# Patient Record
Sex: FEMALE
[De-identification: names, ages, dates, MRNs, and addresses within clinical notes are randomized per-mention and may not be internally consistent; named-entity substitution may affect disease eponyms.]

---

## 2019-10-30 ENCOUNTER — TELEPHONE (OUTPATIENT)
Dept: SCHEDULING | Age: 29
End: 2019-10-30

## 2019-10-30 ENCOUNTER — WALK IN (OUTPATIENT)
Dept: URGENT CARE | Age: 29
End: 2019-10-30

## 2019-10-30 VITALS
HEIGHT: 66 IN | BODY MASS INDEX: 20.89 KG/M2 | HEART RATE: 85 BPM | SYSTOLIC BLOOD PRESSURE: 118 MMHG | WEIGHT: 130 LBS | OXYGEN SATURATION: 99 % | DIASTOLIC BLOOD PRESSURE: 62 MMHG | TEMPERATURE: 98.4 F | RESPIRATION RATE: 16 BRPM

## 2019-10-30 DIAGNOSIS — J01.00 ACUTE NON-RECURRENT MAXILLARY SINUSITIS: Primary | ICD-10-CM

## 2019-10-30 PROCEDURE — 99203 OFFICE O/P NEW LOW 30 MIN: CPT | Performed by: NURSE PRACTITIONER

## 2019-10-30 RX ORDER — AMOXICILLIN AND CLAVULANATE POTASSIUM 875; 125 MG/1; MG/1
1 TABLET, FILM COATED ORAL EVERY 12 HOURS
Qty: 20 TABLET | Refills: 0 | Status: SHIPPED | OUTPATIENT
Start: 2019-10-30 | End: 2019-11-09

## 2019-10-30 ASSESSMENT — ENCOUNTER SYMPTOMS
FACIAL ASYMMETRY: 0
LIGHT-HEADEDNESS: 0
SEIZURES: 0
FEVER: 1
CHOKING: 0
NUMBNESS: 0
COUGH: 1
CHEST TIGHTNESS: 0
ACTIVITY CHANGE: 1
SPEECH DIFFICULTY: 0
APNEA: 0
SHORTNESS OF BREATH: 0
APPETITE CHANGE: 1
SORE THROAT: 1
WHEEZING: 0
WEAKNESS: 0
DIZZINESS: 0
ENDOCRINE NEGATIVE: 1
EYES NEGATIVE: 1
CHILLS: 0
PSYCHIATRIC NEGATIVE: 1
RHINORRHEA: 1
STRIDOR: 0
TREMORS: 0
FATIGUE: 1
SINUS PRESSURE: 1
DIAPHORESIS: 0
GASTROINTESTINAL NEGATIVE: 1

## 2021-08-08 ENCOUNTER — HOSPITAL ENCOUNTER (EMERGENCY)
Facility: HOSPITAL | Age: 31
Discharge: HOME | End: 2021-08-08
Attending: EMERGENCY MEDICINE
Payer: COMMERCIAL

## 2021-08-08 ENCOUNTER — APPOINTMENT (OUTPATIENT)
Dept: ULTRASOUND IMAGING | Facility: HOSPITAL | Age: 31
End: 2021-08-08
Attending: EMERGENCY MEDICINE
Payer: COMMERCIAL

## 2021-08-08 VITALS
BODY MASS INDEX: 21.69 KG/M2 | WEIGHT: 135 LBS | HEIGHT: 66 IN | SYSTOLIC BLOOD PRESSURE: 144 MMHG | HEART RATE: 59 BPM | DIASTOLIC BLOOD PRESSURE: 99 MMHG | OXYGEN SATURATION: 99 % | RESPIRATION RATE: 17 BRPM | TEMPERATURE: 98 F

## 2021-08-08 DIAGNOSIS — N93.8 DYSFUNCTIONAL UTERINE BLEEDING: Primary | ICD-10-CM

## 2021-08-08 LAB
ALBUMIN SERPL-MCNC: 3.7 G/DL (ref 3.4–5)
ALBUMIN/GLOB SERPL: 1.1 {RATIO} (ref 1–2)
ALP LIVER SERPL-CCNC: 79 U/L
ALT SERPL-CCNC: 18 U/L
ANION GAP SERPL CALC-SCNC: 3 MMOL/L (ref 0–18)
AST SERPL-CCNC: 14 U/L (ref 15–37)
B-HCG UR QL: NEGATIVE
BASOPHILS # BLD AUTO: 0.05 X10(3) UL (ref 0–0.2)
BASOPHILS NFR BLD AUTO: 0.8 %
BILIRUB SERPL-MCNC: 0.2 MG/DL (ref 0.1–2)
BILIRUB UR QL STRIP.AUTO: NEGATIVE
BUN BLD-MCNC: 8 MG/DL (ref 7–18)
CALCIUM BLD-MCNC: 9.1 MG/DL (ref 8.5–10.1)
CHLORIDE SERPL-SCNC: 111 MMOL/L (ref 98–112)
CO2 SERPL-SCNC: 25 MMOL/L (ref 21–32)
CREAT BLD-MCNC: 0.87 MG/DL
EOSINOPHIL # BLD AUTO: 0.21 X10(3) UL (ref 0–0.7)
EOSINOPHIL NFR BLD AUTO: 3.3 %
ERYTHROCYTE [DISTWIDTH] IN BLOOD BY AUTOMATED COUNT: 13.8 %
GLOBULIN PLAS-MCNC: 3.4 G/DL (ref 2.8–4.4)
GLUCOSE BLD-MCNC: 89 MG/DL (ref 70–99)
GLUCOSE UR STRIP.AUTO-MCNC: NEGATIVE MG/DL
HCT VFR BLD AUTO: 37.5 %
HGB BLD-MCNC: 12 G/DL
IMM GRANULOCYTES # BLD AUTO: 0.02 X10(3) UL (ref 0–1)
IMM GRANULOCYTES NFR BLD: 0.3 %
KETONES UR STRIP.AUTO-MCNC: NEGATIVE MG/DL
LEUKOCYTE ESTERASE UR QL STRIP.AUTO: NEGATIVE
LYMPHOCYTES # BLD AUTO: 2.77 X10(3) UL (ref 1–4)
LYMPHOCYTES NFR BLD AUTO: 43.4 %
M PROTEIN MFR SERPL ELPH: 7.1 G/DL (ref 6.4–8.2)
MCH RBC QN AUTO: 26.8 PG (ref 26–34)
MCHC RBC AUTO-ENTMCNC: 32 G/DL (ref 31–37)
MCV RBC AUTO: 83.9 FL
MONOCYTES # BLD AUTO: 0.44 X10(3) UL (ref 0.1–1)
MONOCYTES NFR BLD AUTO: 6.9 %
NEUTROPHILS # BLD AUTO: 2.89 X10 (3) UL (ref 1.5–7.7)
NEUTROPHILS # BLD AUTO: 2.89 X10(3) UL (ref 1.5–7.7)
NEUTROPHILS NFR BLD AUTO: 45.3 %
NITRITE UR QL STRIP.AUTO: NEGATIVE
OSMOLALITY SERPL CALC.SUM OF ELEC: 286 MOSM/KG (ref 275–295)
PH UR STRIP.AUTO: 7 [PH] (ref 5–8)
PLATELET # BLD AUTO: 236 10(3)UL (ref 150–450)
POTASSIUM SERPL-SCNC: 4.2 MMOL/L (ref 3.5–5.1)
PROT UR STRIP.AUTO-MCNC: NEGATIVE MG/DL
RBC # BLD AUTO: 4.47 X10(6)UL
RBC #/AREA URNS AUTO: >10 /HPF
SODIUM SERPL-SCNC: 139 MMOL/L (ref 136–145)
SP GR UR STRIP.AUTO: 1.01 (ref 1–1.03)
UROBILINOGEN UR STRIP.AUTO-MCNC: <2 MG/DL
WBC # BLD AUTO: 6.4 X10(3) UL (ref 4–11)

## 2021-08-08 PROCEDURE — 99284 EMERGENCY DEPT VISIT MOD MDM: CPT

## 2021-08-08 PROCEDURE — 81025 URINE PREGNANCY TEST: CPT

## 2021-08-08 PROCEDURE — 93975 VASCULAR STUDY: CPT | Performed by: EMERGENCY MEDICINE

## 2021-08-08 PROCEDURE — 85025 COMPLETE CBC W/AUTO DIFF WBC: CPT | Performed by: EMERGENCY MEDICINE

## 2021-08-08 PROCEDURE — 36415 COLL VENOUS BLD VENIPUNCTURE: CPT

## 2021-08-08 PROCEDURE — 81001 URINALYSIS AUTO W/SCOPE: CPT | Performed by: EMERGENCY MEDICINE

## 2021-08-08 PROCEDURE — 80053 COMPREHEN METABOLIC PANEL: CPT | Performed by: EMERGENCY MEDICINE

## 2021-08-08 PROCEDURE — 76856 US EXAM PELVIC COMPLETE: CPT | Performed by: EMERGENCY MEDICINE

## 2021-08-08 PROCEDURE — 76830 TRANSVAGINAL US NON-OB: CPT | Performed by: EMERGENCY MEDICINE

## 2021-08-08 RX ORDER — IBUPROFEN 600 MG/1
600 TABLET ORAL EVERY 8 HOURS PRN
Qty: 30 TABLET | Refills: 0 | Status: SHIPPED | OUTPATIENT
Start: 2021-08-08 | End: 2021-08-18

## 2021-08-08 RX ORDER — LABETALOL 100 MG/1
100 TABLET, FILM COATED ORAL 2 TIMES DAILY
COMMUNITY
End: 2021-10-27

## 2021-08-08 RX ORDER — IBUPROFEN 600 MG/1
600 TABLET ORAL ONCE
Status: COMPLETED | OUTPATIENT
Start: 2021-08-08 | End: 2021-08-08

## 2021-08-08 NOTE — ED INITIAL ASSESSMENT (HPI)
Patient 5 weeks post partum. Heavy bleeding started this morning. Patient sts using heavy pads and sts went through 4-5 since 9 am. Pt sts slightly lightheaded this afternoon.

## 2021-08-10 NOTE — ED PROVIDER NOTES
Patient Seen in: [facility] Emergency Department      History   Patient presents with:  Eval-G    Stated Complaint: Pt is 5 weeks post partum - started with heavy bleeding     HPI/Subjective:   HPI    Patient is a 39-year-old female presents Dre Eduardo patient was already wearing a droplet mask on my arrival to the room.  Personal protective equipment including droplet mask, eye protection, and gloves were worn throughout the duration of the exam.  Handwashing was performed prior to and after the exam.  S -----------         ------                     CBC W/ DIFFERENTIAL[[id number]]                              Final result                 Please view results for these tests on the individual orders.    RAINBOW DRAW LAVENDER   RAINBOW DRAW LIGHT is normal arterial and venous Doppler wave forms in both ovaries. The spectral analysis is within normal limits. OTHER:  Negative. CONCLUSION:   1. No evidence of torsion. 2. Thickened endometrium is noted.   Definite retained products are not When identified these errors have been corrected.  While every attempt is made to correct errors during dictation discrepancies may still exist                             Disposition and Plan     Clinical Impression:  Dysfunctional uterine bleeding  (prima

## 2021-09-22 ENCOUNTER — HOSPITAL ENCOUNTER (EMERGENCY)
Facility: HOSPITAL | Age: 31
Discharge: HOME | End: 2021-09-23
Payer: COMMERCIAL

## 2021-09-22 ENCOUNTER — APPOINTMENT (OUTPATIENT)
Dept: CT IMAGING | Facility: HOSPITAL | Age: 31
End: 2021-09-22
Attending: NURSE PRACTITIONER
Payer: COMMERCIAL

## 2021-09-22 DIAGNOSIS — N12 PYELONEPHRITIS: Primary | ICD-10-CM

## 2021-09-22 LAB
ALBUMIN SERPL-MCNC: 3.9 G/DL (ref 3.4–5)
ALBUMIN/GLOB SERPL: 1.1 {RATIO} (ref 1–2)
ALP LIVER SERPL-CCNC: 60 U/L
ALT SERPL-CCNC: 16 U/L
ANION GAP SERPL CALC-SCNC: 8 MMOL/L (ref 0–18)
AST SERPL-CCNC: 10 U/L (ref 15–37)
B-HCG UR QL: NEGATIVE
BASOPHILS # BLD AUTO: 0.04 X10(3) UL (ref 0–0.2)
BASOPHILS NFR BLD AUTO: 0.6 %
BILIRUB SERPL-MCNC: 0.4 MG/DL (ref 0.1–2)
BILIRUB UR QL: NEGATIVE
BUN BLD-MCNC: 11 MG/DL (ref 7–18)
BUN/CREAT SERPL: 14.5 (ref 10–20)
CALCIUM BLD-MCNC: 9.2 MG/DL (ref 8.5–10.1)
CHLORIDE SERPL-SCNC: 107 MMOL/L (ref 98–112)
CLARITY UR: CLEAR
CO2 SERPL-SCNC: 24 MMOL/L (ref 21–32)
COLOR UR: YELLOW
CREAT BLD-MCNC: 0.76 MG/DL
DEPRECATED RDW RBC AUTO: 40 FL (ref 35.1–46.3)
EOSINOPHIL # BLD AUTO: 0.08 X10(3) UL (ref 0–0.7)
EOSINOPHIL NFR BLD AUTO: 1.1 %
ERYTHROCYTE [DISTWIDTH] IN BLOOD BY AUTOMATED COUNT: 13.2 % (ref 11–15)
GLOBULIN PLAS-MCNC: 3.5 G/DL (ref 2.8–4.4)
GLUCOSE BLD-MCNC: 92 MG/DL (ref 70–99)
GLUCOSE UR-MCNC: NEGATIVE MG/DL
HCT VFR BLD AUTO: 37.1 %
HGB BLD-MCNC: 12 G/DL
IMM GRANULOCYTES # BLD AUTO: 0.01 X10(3) UL (ref 0–1)
IMM GRANULOCYTES NFR BLD: 0.1 %
KETONES UR-MCNC: NEGATIVE MG/DL
LEUKOCYTE ESTERASE UR QL STRIP.AUTO: NEGATIVE
LIPASE SERPL-CCNC: 137 U/L (ref 73–393)
LYMPHOCYTES # BLD AUTO: 3.04 X10(3) UL (ref 1–4)
LYMPHOCYTES NFR BLD AUTO: 43.2 %
MCH RBC QN AUTO: 26.8 PG (ref 26–34)
MCHC RBC AUTO-ENTMCNC: 32.3 G/DL (ref 31–37)
MCV RBC AUTO: 83 FL
MONOCYTES # BLD AUTO: 0.67 X10(3) UL (ref 0.1–1)
MONOCYTES NFR BLD AUTO: 9.5 %
NEUTROPHILS # BLD AUTO: 3.19 X10 (3) UL (ref 1.5–7.7)
NEUTROPHILS # BLD AUTO: 3.19 X10(3) UL (ref 1.5–7.7)
NEUTROPHILS NFR BLD AUTO: 45.5 %
NITRITE UR QL STRIP.AUTO: NEGATIVE
OSMOLALITY SERPL CALC.SUM OF ELEC: 287 MOSM/KG (ref 275–295)
PH UR: 6 [PH] (ref 5–8)
PLATELET # BLD AUTO: 214 10(3)UL (ref 150–450)
POTASSIUM SERPL-SCNC: 4 MMOL/L (ref 3.5–5.1)
PROT SERPL-MCNC: 7.4 G/DL (ref 6.4–8.2)
PROT UR-MCNC: NEGATIVE MG/DL
RBC # BLD AUTO: 4.47 X10(6)UL
SODIUM SERPL-SCNC: 139 MMOL/L (ref 136–145)
SP GR UR STRIP: 1.01 (ref 1–1.03)
UROBILINOGEN UR STRIP-ACNC: <2
WBC # BLD AUTO: 7 X10(3) UL (ref 4–11)

## 2021-09-22 PROCEDURE — 80053 COMPREHEN METABOLIC PANEL: CPT | Performed by: NURSE PRACTITIONER

## 2021-09-22 PROCEDURE — 96361 HYDRATE IV INFUSION ADD-ON: CPT

## 2021-09-22 PROCEDURE — 74176 CT ABD & PELVIS W/O CONTRAST: CPT | Performed by: NURSE PRACTITIONER

## 2021-09-22 PROCEDURE — 83690 ASSAY OF LIPASE: CPT | Performed by: NURSE PRACTITIONER

## 2021-09-22 PROCEDURE — 81001 URINALYSIS AUTO W/SCOPE: CPT | Performed by: NURSE PRACTITIONER

## 2021-09-22 PROCEDURE — 81025 URINE PREGNANCY TEST: CPT

## 2021-09-22 PROCEDURE — 85025 COMPLETE CBC W/AUTO DIFF WBC: CPT | Performed by: NURSE PRACTITIONER

## 2021-09-22 PROCEDURE — 96375 TX/PRO/DX INJ NEW DRUG ADDON: CPT

## 2021-09-22 PROCEDURE — 99284 EMERGENCY DEPT VISIT MOD MDM: CPT

## 2021-09-22 PROCEDURE — 96365 THER/PROPH/DIAG IV INF INIT: CPT

## 2021-09-22 RX ORDER — KETOROLAC TROMETHAMINE 15 MG/ML
15 INJECTION, SOLUTION INTRAMUSCULAR; INTRAVENOUS ONCE
Status: COMPLETED | OUTPATIENT
Start: 2021-09-22 | End: 2021-09-22

## 2021-09-22 RX ORDER — MORPHINE SULFATE 2 MG/ML
2 INJECTION, SOLUTION INTRAMUSCULAR; INTRAVENOUS ONCE
Status: COMPLETED | OUTPATIENT
Start: 2021-09-22 | End: 2021-09-22

## 2021-09-23 VITALS
HEIGHT: 66 IN | WEIGHT: 135 LBS | HEART RATE: 72 BPM | DIASTOLIC BLOOD PRESSURE: 85 MMHG | TEMPERATURE: 99 F | OXYGEN SATURATION: 99 % | RESPIRATION RATE: 16 BRPM | BODY MASS INDEX: 21.69 KG/M2 | SYSTOLIC BLOOD PRESSURE: 130 MMHG

## 2021-09-23 RX ORDER — CEPHALEXIN 500 MG/1
500 CAPSULE ORAL 2 TIMES DAILY
Qty: 20 CAPSULE | Refills: 0 | Status: SHIPPED | OUTPATIENT
Start: 2021-09-23 | End: 2021-09-23

## 2021-09-23 RX ORDER — CEPHALEXIN 500 MG/1
500 CAPSULE ORAL 2 TIMES DAILY
Qty: 20 CAPSULE | Refills: 0 | Status: SHIPPED | OUTPATIENT
Start: 2021-09-23 | End: 2021-10-03

## 2021-09-23 RX ORDER — ONDANSETRON 4 MG/1
4 TABLET, ORALLY DISINTEGRATING ORAL EVERY 4 HOURS PRN
Qty: 10 TABLET | Refills: 0 | Status: SHIPPED | OUTPATIENT
Start: 2021-09-23 | End: 2021-09-29

## 2021-09-23 NOTE — ED QUICK NOTES
Patient provided with discharge instructions. Verbalized understanding for plan of care at home and follow up. All question and concerns addressed prior to discharge. Iv removed, catheter intact. Let her know rx was sent to pharmacy.

## 2021-09-23 NOTE — ED PROVIDER NOTES
Patient Seen in: [facility] AND [facility] Emergency Department      History   Patient presents with:  Abdomen/Flank Pain    Stated Complaint: flank pain    Subjective:   32yo/f with hx of pre-eclampsia, 10 weeks post partum reports with left sided flank pain.  Luis M Ralphs sounds. Pulmonary:      Effort: Pulmonary effort is normal.      Breath sounds: Normal breath sounds. Abdominal:      General: Bowel sounds are normal.      Palpations: Abdomen is soft. Tenderness: There is no abdominal tenderness.  There is left C There is prominence of the bilateral ureters. Findings may indicate urinary tract infection with ascending UTI. Surgical change of recent  section. No fluid collection in the operative site/surgical wound. No obstructing renal stone.   4 mm

## 2021-10-22 PROBLEM — Z31.69 PRE-CONCEPTION COUNSELING: Status: ACTIVE | Noted: 2021-10-22

## 2021-11-24 ENCOUNTER — NURSE ONLY (OUTPATIENT)
Dept: HEMATOLOGY/ONCOLOGY | Facility: HOSPITAL | Age: 31
End: 2021-11-24
Payer: COMMERCIAL

## 2021-11-24 ENCOUNTER — GENETICS ENCOUNTER (OUTPATIENT)
Dept: GENETICS | Facility: HOSPITAL | Age: 31
End: 2021-11-24
Payer: COMMERCIAL

## 2021-11-24 DIAGNOSIS — Z87.59 HISTORY OF NEONATAL DEATH: ICD-10-CM

## 2021-11-24 DIAGNOSIS — Z31.430 ENCOUNTER OF FEMALE FOR TESTING FOR GENETIC DISEASE CARRIER STATUS FOR PROCREATIVE MANAGEMENT: Primary | ICD-10-CM

## 2021-11-24 PROCEDURE — 36415 COLL VENOUS BLD VENIPUNCTURE: CPT

## 2021-11-24 PROCEDURE — 96040 HC GENETIC COUNSELING EA 30 MIN: CPT

## 2021-11-24 NOTE — PROGRESS NOTES
Patient Name: Radha Skinner  YOB: 1990  Date of Visit: 2021    Reason for visit: Ms. Giulia Al was seen for the purposes of genetic counseling due to a history of miscarriage and a  death.     Medical History: Ms. Giulia Al is a plea information is known. Her paternal grandmother  in her early-80s with a h/o a gynecological cancer, possibly an ovarian cancer, dx in her mid-70s. Her paternal grandfather  >50y due to lung cancer.  The rest of the family history is negative for oth genetic test is 100 percent accurate and that a negative carrier screening result does not mean that any pregnancy will be completely healthy.   All women who are thinking about becoming pregnant or who are already pregnant are offered carrier screening for grandmother's cancer dx and she was encouraged to contact me with questions/updated information. Ms. Álvaro Beard appeared to understand the information presented. On the day of the visit Ms. Álvaro Beard elected to proceed with carrier screening and the comprehensiv

## 2021-12-03 ENCOUNTER — GENETICS ENCOUNTER (OUTPATIENT)
Dept: HEMATOLOGY/ONCOLOGY | Facility: HOSPITAL | Age: 31
End: 2021-12-03

## 2021-12-03 NOTE — PROGRESS NOTES
Patient Name: Clay Saeed  YOB: 1990         Reason for Referral:  Ms. Reyes Deans had genetic testing performed on 11/24/2021 for proactive carrier screening. Genetic Testing Result:  Negative for carrier status.  No pathogenic variant wa Please do not hesitate to contact my office if you have any questions or concerns, [phone].        Mallory Rosas MS, CGC

## 2021-12-27 ENCOUNTER — APPOINTMENT (OUTPATIENT)
Dept: ULTRASOUND IMAGING | Facility: HOSPITAL | Age: 31
End: 2021-12-27
Attending: EMERGENCY MEDICINE
Payer: COMMERCIAL

## 2021-12-27 ENCOUNTER — HOSPITAL ENCOUNTER (EMERGENCY)
Facility: HOSPITAL | Age: 31
Discharge: HOME | End: 2021-12-28
Attending: EMERGENCY MEDICINE
Payer: COMMERCIAL

## 2021-12-27 DIAGNOSIS — R10.9 LEFT FLANK PAIN: Primary | ICD-10-CM

## 2021-12-27 DIAGNOSIS — Z34.90 EARLY STAGE OF PREGNANCY: ICD-10-CM

## 2021-12-27 LAB
ANTIBODY SCREEN: NEGATIVE
B-HCG SERPL-ACNC: 7851 MIU/ML
BASOPHILS # BLD AUTO: 0.04 X10(3) UL (ref 0–0.2)
BASOPHILS NFR BLD AUTO: 0.5 %
EOSINOPHIL # BLD AUTO: 0.09 X10(3) UL (ref 0–0.7)
EOSINOPHIL NFR BLD AUTO: 1.2 %
ERYTHROCYTE [DISTWIDTH] IN BLOOD BY AUTOMATED COUNT: 13.8 %
HCT VFR BLD AUTO: 39.6 %
HGB BLD-MCNC: 13.2 G/DL
IMM GRANULOCYTES # BLD AUTO: 0.02 X10(3) UL (ref 0–1)
IMM GRANULOCYTES NFR BLD: 0.3 %
LYMPHOCYTES # BLD AUTO: 2.8 X10(3) UL (ref 1–4)
LYMPHOCYTES NFR BLD AUTO: 37.5 %
MCH RBC QN AUTO: 27.1 PG (ref 26–34)
MCHC RBC AUTO-ENTMCNC: 33.3 G/DL (ref 31–37)
MCV RBC AUTO: 81.3 FL
MONOCYTES # BLD AUTO: 0.62 X10(3) UL (ref 0.1–1)
MONOCYTES NFR BLD AUTO: 8.3 %
NEUTROPHILS # BLD AUTO: 3.9 X10 (3) UL (ref 1.5–7.7)
NEUTROPHILS # BLD AUTO: 3.9 X10(3) UL (ref 1.5–7.7)
NEUTROPHILS NFR BLD AUTO: 52.2 %
PLATELET # BLD AUTO: 240 10(3)UL (ref 150–450)
RBC # BLD AUTO: 4.87 X10(6)UL
RH BLOOD TYPE: POSITIVE
WBC # BLD AUTO: 7.5 X10(3) UL (ref 4–11)

## 2021-12-27 PROCEDURE — 36415 COLL VENOUS BLD VENIPUNCTURE: CPT | Performed by: EMERGENCY MEDICINE

## 2021-12-27 PROCEDURE — 86900 BLOOD TYPING SEROLOGIC ABO: CPT

## 2021-12-27 PROCEDURE — 86850 RBC ANTIBODY SCREEN: CPT

## 2021-12-27 PROCEDURE — 76817 TRANSVAGINAL US OBSTETRIC: CPT | Performed by: EMERGENCY MEDICINE

## 2021-12-27 PROCEDURE — 76801 OB US < 14 WKS SINGLE FETUS: CPT | Performed by: EMERGENCY MEDICINE

## 2021-12-27 PROCEDURE — 86901 BLOOD TYPING SEROLOGIC RH(D): CPT | Performed by: EMERGENCY MEDICINE

## 2021-12-27 PROCEDURE — 86850 RBC ANTIBODY SCREEN: CPT | Performed by: EMERGENCY MEDICINE

## 2021-12-27 PROCEDURE — 85025 COMPLETE CBC W/AUTO DIFF WBC: CPT

## 2021-12-27 PROCEDURE — 99284 EMERGENCY DEPT VISIT MOD MDM: CPT | Performed by: EMERGENCY MEDICINE

## 2021-12-27 PROCEDURE — 84702 CHORIONIC GONADOTROPIN TEST: CPT | Performed by: EMERGENCY MEDICINE

## 2021-12-27 PROCEDURE — 85025 COMPLETE CBC W/AUTO DIFF WBC: CPT | Performed by: EMERGENCY MEDICINE

## 2021-12-27 PROCEDURE — 86901 BLOOD TYPING SEROLOGIC RH(D): CPT

## 2021-12-27 PROCEDURE — 84702 CHORIONIC GONADOTROPIN TEST: CPT

## 2021-12-27 PROCEDURE — 86900 BLOOD TYPING SEROLOGIC ABO: CPT | Performed by: EMERGENCY MEDICINE

## 2021-12-27 NOTE — ED INITIAL ASSESSMENT (HPI)
PT TO ED WITH C/O LEFT PELVIC PAIN THAT RADIATES TO LEFT LOWER BACK X 2 DAYS, + NAUSEA, +SPOTTING ALL LAST WEEK THAT HAS DECREASED.  PT 5 MONTHS POSTPARTUM, BABY PASSED AT 33 WEEKS GESTATION WITH C - SECTION FROM HEMORHAGE, PT CURRENTLY  5 WEEKS PREGNANT, D

## 2021-12-28 VITALS
DIASTOLIC BLOOD PRESSURE: 78 MMHG | SYSTOLIC BLOOD PRESSURE: 130 MMHG | OXYGEN SATURATION: 100 % | HEIGHT: 66 IN | TEMPERATURE: 98 F | WEIGHT: 140 LBS | HEART RATE: 86 BPM | RESPIRATION RATE: 18 BRPM | BODY MASS INDEX: 22.5 KG/M2

## 2021-12-28 LAB
BILIRUB UR QL STRIP.AUTO: NEGATIVE
CLARITY UR REFRACT.AUTO: CLEAR
COLOR UR AUTO: YELLOW
GLUCOSE UR STRIP.AUTO-MCNC: NEGATIVE MG/DL
KETONES UR STRIP.AUTO-MCNC: NEGATIVE MG/DL
LEUKOCYTE ESTERASE UR QL STRIP.AUTO: NEGATIVE
NITRITE UR QL STRIP.AUTO: NEGATIVE
PH UR STRIP.AUTO: 7 [PH] (ref 5–8)
PROT UR STRIP.AUTO-MCNC: NEGATIVE MG/DL
RBC UR QL AUTO: NEGATIVE
SP GR UR STRIP.AUTO: 1.01 (ref 1–1.03)
UROBILINOGEN UR STRIP.AUTO-MCNC: <2 MG/DL

## 2021-12-28 PROCEDURE — 81003 URINALYSIS AUTO W/O SCOPE: CPT | Performed by: EMERGENCY MEDICINE

## 2021-12-28 NOTE — ED PROVIDER NOTES
Patient Seen in: [facility] Emergency Department      History   Patient presents with:  Eval-G    Stated Complaint: abdominal cramping, pregnant 5 weeks, no bleeding    Subjective:   HPI    Left adnexal pain x2 days.   This is a 23-year-old patient s waited in the waiting room for nearly 7 hours to see me. When I go back to see the patient, she is kind and cooperative but is having left adnexal discomfort.   No spotting no significant vaginal bleeding but obviously is concerned about the health of this Pupils are equal, round, and reactive to light. Neck:      Vascular: No JVD. Cardiovascular:      Rate and Rhythm: Normal rate and regular rhythm. Heart sounds: Normal heart sounds. No murmur heard. No friction rub. No gallop.     Pulmonary: ---------                               -----------         ------                     CBC W/ DIFFERENTIAL[[id number]]                              Final result                 Please view results for these tests on the individual orders.    URINALYSIS WIT the endometrium is     noted. FETAL POLE:  Not visualized. YOLK SAC:  Not visualized. CARDIAC ACTIVITY:  Present. UTERUS:  Normal.         RIGHT OVARY:  3.7 x 3.2 x 4.4 cm.   2.5 x 2.0 x 2.1 cm cyst.  Arterial and     venous flow is demonstrated Disposition:  Discharge  12/28/2021  1:16 am    Follow-up:  [facility], [address]  [id number]  [phone]    Schedule an appointment as soon as possible for a visit  asap          Medications Prescribed:  Discharge Medic

## 2021-12-30 ENCOUNTER — TELEPHONE (OUTPATIENT)
Dept: PERINATAL CARE | Facility: HOSPITAL | Age: 31
End: 2021-12-30

## 2021-12-30 NOTE — TELEPHONE ENCOUNTER
----- Message from Michelle Blake sent at 12/28/2021  7:39 PM CST -----  Regarding: FW: Follow up  Why must you bond. .. please advise.  Review of the chart shows OB has been very responsive and attentive.   ----- Message -----  From: Arnie Rae

## 2021-12-30 NOTE — TELEPHONE ENCOUNTER
The patient reached out to me via [employer] regarding her complaint of abdominal cramping and a recent ER visit. I reviewed that at 5 weeks gestation it may be impossible to confirm a healthy intrauterine pregnancy.   I reviewed that the patient should hav

## 2022-02-03 NOTE — ED INITIAL ASSESSMENT (HPI)
Pt to ED with c/o lower pelvic pain that started about 2 hours prior to arrival. Pt states approximately 10 weeks pregnant. Pt denies fall or trauma. . Pt denies dysuria or hematuria. Pt denies fever. Pt denies vaginal discharge.

## 2022-04-02 NOTE — ED QUICK NOTES
Pt appears comfortable on cart, denies complaints at this time. family at bedside. Pt reports she feels as though her bladder is full at this time so will hold on fluid administration until after completion of US.

## 2022-04-02 NOTE — ED INITIAL ASSESSMENT (HPI)
Patient states that she had a syncopal episode at work with LOC. She is 19 week pregnant. When patient fell she did hit her head on the concrete floor. She is having left sided hip pain, head pain, and some RLQ pain.

## 2022-07-13 NOTE — PROGRESS NOTES
admitted to triage 3 via wheelchair with  present. Pt to bathroom, gown on, pt to bed. Pt states ctxs began around 0000, denies lof or bleeding. Pt states she does not feel the ctxs but can see them when her stomach contracts, with her last pregnancy she did not feel ctxs until close to the end of her labor. efm and toco applied. Initial assessment done.

## 2022-07-13 NOTE — PROGRESS NOTES
poc discussed with pt including discharge home, returning in 24 hours for second btmz, ffn, and monitoring. Pt agrees, questions answered. Written and verbal discharge instructions given. Pt and  agrees. efm and toco removed, pt up to dress.

## 2022-07-13 NOTE — NST
Nonstress Test   Patient: Bob Ríos    Gestation: 33w2d    NST:       Variability: Moderate           Accelerations: Yes           Decelerations: None            Baseline: 130 BPM           Uterine Irritability: No           Contractions: Irregular                    Contraction Frequency: 7-9                   Acoustic Stimulator: No           Nonstress Test Interpretation: Reactive           Nonstress Test Second Interpretation: Reactive                     Additional Comments

## 2022-07-14 NOTE — NST
Nonstress Test   Patient: Corinne Varghese    Gestation: 33w3d    NST:       Variability: Moderate           Accelerations: Yes           Decelerations: None            Baseline: 135 BPM           Uterine Irritability: No           Contractions: Irregular                                        Acoustic Stimulator: No           Nonstress Test Interpretation: Reactive                                 Additional Comments       Physician Evaluation      NST Interpretation: Reactive    Disposition:   Discharged    Comments:    none    Damaris Evangelista MD

## 2022-07-14 NOTE — PROGRESS NOTES
Pt is a 27year old female admitted to TRG4/TRG4-A. Patient presents with:  Betamethasone Injection  Vaginal Bleeding     Pt is  33w3d intra-uterine pregnancy. History obtained, consents signed. Oriented to room, staff, and plan of care. Pt here for second betamethasone injection and NST. States she had some dark brown bleeding at approximately 0300. Small amount brown discharge noted on pad worn to hospital. No active bleeding. Pt reports irregular, mild ctx, denies pain, LOF. EFM tested and applied, FHTs tracing and audible in 130s. Abdomen soft and non-tender.

## 2022-07-14 NOTE — PROGRESS NOTES
Discharge instructions given and explained, pt verbalizes understanding and agrees. Will call office today to f/u regarding NST appointment. Pt discharged home via wc accompanied by .

## 2022-07-19 NOTE — PROGRESS NOTES
Discharged to home per ambulatory in stable condition with written and verbal instructions. Pt to follow up at her already scheduled appts this week. Patient verbalizes understanding of information given.

## 2022-07-19 NOTE — NST
Nonstress Test   Patient: Sarah Laureano    Gestation: 34w0d    NST:       Variability: Moderate           Accelerations: Yes           Decelerations: None            Baseline: 135 BPM           Uterine Irritability: No           Contractions: Irregular                    Contraction Frequency: 6-9                   Acoustic Stimulator: No           Nonstress Test Interpretation: Reactive           Nonstress Test Second Interpretation: Reactive                     Additional Comments

## 2022-07-19 NOTE — PROGRESS NOTES
Pt is a 27year old female admitted to TRG2/TRG2-A. Patient presents with:  Decreased Fetal Movement: Decreased FM all day, has felt her move a few times this morning, un sure of last Fm     Pt is  34w0d intra-uterine pregnancy. History obtained, consents signed. Oriented to room, staff, and plan of care.

## 2022-07-28 NOTE — PLAN OF CARE
Problem: SAFETY ADULT - FALL  Goal: Free from fall injury  Description: INTERVENTIONS:  - Assess pt frequently for physical needs  - Identify cognitive and physical deficits and behaviors that affect risk of falls.   - [place] fall precautions as indicated by assessment.  - Educate pt/family on patient safety including physical limitations  - Instruct pt to call for assistance with activity based on assessment  - Modify environment to reduce risk of injury  - Provide assistive devices as appropriate  - Consider OT/PT consult to assist with strengthening/mobility  - Encourage toileting schedule  Outcome: Progressing

## 2022-07-28 NOTE — PLAN OF CARE
Problem: SAFETY ADULT - FALL  Goal: Free from fall injury  Description: INTERVENTIONS:  - Assess pt frequently for physical needs  - Identify cognitive and physical deficits and behaviors that affect risk of falls.   - [place] fall precautions as indicated by assessment.  - Educate pt/family on patient safety including physical limitations  - Instruct pt to call for assistance with activity based on assessment  - Modify environment to reduce risk of injury  - Provide assistive devices as appropriate  - Consider OT/PT consult to assist with strengthening/mobility  - Encourage toileting schedule  Outcome: Progressing

## 2022-07-28 NOTE — PROGRESS NOTES
27year old  at 35+2 weeks gestation presents to triage with c/o contractions for several hours. Pt denies leaking of fluid or vaginal bleeding and reports good fetal movement before contractions started. Plan of care for triage discussed with patient.

## 2022-07-28 NOTE — PLAN OF CARE
Problem: BIRTH - VAGINAL/ SECTION  Goal: Fetal and maternal status remain reassuring during the birth process  Description: INTERVENTIONS:  - Monitor vital signs  - Monitor fetal heart rate  - Monitor uterine activity  - Monitor labor progression (vaginal delivery)  - DVT prophylaxis (C/S delivery)  - Surgical antibiotic prophylaxis (C/S delivery)  Outcome: Progressing     Problem: PAIN - ADULT  Goal: Verbalizes/displays adequate comfort level or patient's stated pain goal  Description: INTERVENTIONS:  - Encourage pt to monitor pain and request assistance  - Assess pain using appropriate pain scale  - Administer analgesics based on type and severity of pain and evaluate response  - Implement non-pharmacological measures as appropriate and evaluate response  - Consider cultural and social influences on pain and pain management  - Manage/alleviate anxiety  - Utilize distraction and/or relaxation techniques  - Monitor for opioid side effects  - Notify MD/LIP if interventions unsuccessful or patient reports new pain  - Anticipate increased pain with activity and pre-medicate as appropriate  Outcome: Progressing     Problem: ANXIETY  Goal: Will report anxiety at manageable levels  Description: INTERVENTIONS:  - Administer medication as ordered  - Teach and rehearse alternative coping skills  - Provide emotional support with 1:1 interaction with staff  Outcome: Progressing     Problem: Patient/Family Goals  Goal: Patient/Family Long Term Goal  Description: Patient's Long Term Goal:   Safe  delivery  Interventions: Follow prescribed plan of care  - See additional Care Plan goals for specific interventions  Outcome: Progressing  Goal: Patient/Family Short Term Goal  Description: Patient's Short Term Goal:   Adequate pain mangement  Interventions:    Follow prescribed plan of care  - See additional Care Plan goals for specific interventions  Outcome: Progressing

## 2022-07-28 NOTE — ANESTHESIA PROCEDURE NOTES
Spinal Block  Performed by: Matt Rojas MD  Authorized by: Matt Rojas MD       General Information and Staff    Start Time:  7/27/2022 10:47 PM  End Time:  7/27/2022 10:49 PM  Anesthesiologist:  Matt Rojas MD  Performed by:   Anesthesiologist  Patient Location:  OR  Site identification: surface landmarks    Preanesthetic Checklist: patient identified, IV checked, risks and benefits discussed, monitors and equipment checked, pre-op evaluation, timeout performed, anesthesia consent and sterile technique used      Procedure Details    Patient Position:  Sitting  Prep: ChloraPrep    Monitoring:  Cardiac monitor, heart rate and continuous pulse ox  Approach:  Midline  Location:  L3-4  Injection Technique:  Single-shot    Needle    Needle Type:  Sprotte  Needle Gauge:  24 G  Needle Length:  3.5 in    Assessment    Sensory Level:   Events: clear CSF, CSF aspirated, well tolerated and blood negative     Additional Comments

## 2022-07-28 NOTE — OPERATIVE REPORT
Joanna Pimentel Patient Status:  Inpatient    1990 MRN [id number]   Location [address] [place] [address] Attending Valdo Malik MD   Hosp Day # 0 PCP None Pcp     Preop Dx:  previous  section, pre-term labor  Postop Dx:  Same  Procedure: repeat  low transverses  section   Surgeon: Vishal Mcallister M.D. Assistant: MARIA D Camarena  Anesthesia:   Spinal    Indications: This patient is a 27year old y/o   woman presenting with labor. The procedures , risks and complications were discussed with the patient including but not limited to infection, hemorrhage, blood transfusion, bowel bladder and ureteral injury, DVT and anesthetic risks. Her questions were answered. Procedure: The patient was prepped and draped in a sterile fashion after satisfactory spinal anesthesia was given. A transverse skin incision was made with a scalpel and extended to the fascia. The fascia was incised in the midline with a scalpel then the incision extended laterally with a quinones scissors. The fascia was dissected from the muscles both inferiorly and superiorly with sharp and blunt dissection. The peritoneum was elevated in incised with a jamie and extended superiorly and inferiorly with good visualization of the bladder , an Nima retractor was placed in the abdominal cavity and opened. A bladder flap created. The uterus was incised partway through with a scalpel, entered bluntly with the tip of my finger and the uterine incision was extended bilaterally with cephalocaudal manual traction. Membranes were ruptured bluntly. The head was flexed and lifted to the incision then the mouth and nose were suctioned with a bulb syringe. The infant was delivered with fundal pressure, the cord clamped and cut, then the female infant taken to the [facility] where Neonatology was in attendance. Cord blood was obtained. Cord gasses were obtained.  The placenta was delivered with fundal massage and was normal.  The uterus was left in situ. The uterine cavity was cleaned and the incision closed in a running locking suture or number one chromic. Additional sutures of 2-0 chromic were placed for hemostasis as needed. The paracolic gutters were cleaned and the uterine incision inspected again for hemostasis. The melani retractor was removed. The peritoneum wand rectus muscles were closed with interrupted sutres of 2-0 chromic. After noting excellent subfascial hemostasis, the fascia was closed with a running suture of #1 Vicryl. The sub Q was inspected, bleeders cauterized. The skin closed with running  4-0 monocryl sub Q sutures and steri strips. The patient tolerated the procedure well with an 800 cc EBL and went to recovery in good condition.     Robi Art MD  07/27/22

## 2022-07-28 NOTE — PROGRESS NOTES
NURSING ADMISSION NOTE    Received mother in stable condition in room 2196. Safety precautions initiated. Call light within reach, bed in low position. Patient and family oriented to room and updated on POC.

## 2022-07-28 NOTE — PLAN OF CARE
Problem: SAFETY ADULT - FALL  Goal: Free from fall injury  Description: INTERVENTIONS:  - Assess pt frequently for physical needs  - Identify cognitive and physical deficits and behaviors that affect risk of falls. - [place] fall precautions as indicated by assessment.  - Educate pt/family on patient safety including physical limitations  - Instruct pt to call for assistance with activity based on assessment  - Modify environment to reduce risk of injury  - Provide assistive devices as appropriate  - Consider OT/PT consult to assist with strengthening/mobility  - Encourage toileting schedule  Outcome: Progressing     Problem: POSTPARTUM  Goal: Long Term Goal:Experiences normal postpartum course  Description: INTERVENTIONS:  - Assess and monitor vital signs and lab values. - Assess fundus and lochia. - Provide ice/sitz baths for perineum discomfort. - Monitor healing of incision/episiotomy/laceration, and assess for signs and symptoms of infection and hematoma. - Assess bladder function and monitor for bladder distention.  - Provide/instruct/assist with pericare as needed. - Provide VTE prophylaxis as needed. - Monitor bowel function.  - Encourage ambulation and provide assistance as needed. - Assess and monitor emotional status and provide social service/psych resources as needed. - Utilize standard precautions and use personal protective equipment as indicated. Ensure aseptic care of all intravenous lines and invasive tubes/drains.  - Obtain immunization and exposure to communicable diseases history. Outcome: Progressing  Goal: Optimize infant feeding at the breast  Description: INTERVENTIONS:  - Initiate breast feeding within first hour after birth. - Monitor effectiveness of current breast feeding efforts. - Assess support systems available to mother/family.  - Identify cultural beliefs/practices regarding lactation, letdown techniques, maternal food preferences.   - Assess mother's knowledge and previous experience with breast feeding.  - Provide information as needed about early infant feeding cues (e.g., rooting, lip smacking, sucking fingers/hand) versus late cue of crying.  - Discuss/demonstrate breast feeding aids (e.g., infant sling, nursing footstool/pillows, and breast pumps). - Encourage mother/other family members to express feelings/concerns, and actively listen. - Educate father/SO about benefits of breast feeding and how to manage common lactation challenges. - Recommend avoidance of specific medications or substances incompatible with breast feeding.  - Assess and monitor for signs of nipple pain/trauma. - Instruct and provide assistance with proper latch. - Review techniques for milk expression (breast pumping) and storage of breast milk. Provide pumping equipment/supplies, instructions and assistance, as needed. - Encourage rooming-in and breast feeding on demand.  - Encourage skin-to-skin contact. - Provide LC support as needed. - Assess for and manage engorgement. - Provide breast feeding education handouts and information on community breast feeding support. Outcome: Progressing  Goal: Establishment of adequate milk supply with medication/procedure interruptions  Description: INTERVENTIONS:  - Review techniques for milk expression (breast pumping). - Provide pumping equipment/supplies, instructions, and assistance until it is safe to breastfeed infant. Outcome: Progressing  Goal: Experiences normal breast weaning course  Description: INTERVENTIONS:  - Assess for and manage engorgement. - Instruct on breast care. - Provide comfort measures. Outcome: Progressing  Goal: Appropriate maternal -  bonding  Description: INTERVENTIONS:  - Assess caregiver- interactions. - Assess caregiver's emotional status and coping mechanisms. - Encourage caregiver to participate in  daily care.   - Assess support systems available to mother/family.  - Provide social services/case management support as needed.   Outcome: Progressing     Problem: GASTROINTESTINAL - ADULT  Goal: Minimal or absence of nausea and vomiting  Description: INTERVENTIONS:  - Maintain adequate hydration with IV or PO as ordered and tolerated  - Nasogastric tube to low intermittent suction as ordered  - Evaluate effectiveness of ordered antiemetic medications  - Provide nonpharmacologic comfort measures as appropriate  - Advance diet as tolerated, if ordered  - Obtain nutritional consult as needed  - Evaluate fluid balance  Outcome: Progressing  Goal: Maintains or returns to baseline bowel function  Description: INTERVENTIONS:  - Assess bowel function  - Maintain adequate hydration with IV or PO as ordered and tolerated  - Evaluate effectiveness of GI medications  - Encourage mobilization and activity  - Obtain nutritional consult as needed  - Establish a toileting routine/schedule  - Consider collaborating with pharmacy to review patient's medication profile  Outcome: Progressing  Goal: Maintains adequate nutritional intake (undernourished)  Description: INTERVENTIONS:  - Monitor percentage of each meal consumed  - Identify factors contributing to decreased intake, treat as appropriate  - Assist with meals as needed  - Monitor I&O, WT and lab values  - Obtain nutritional consult as needed  - Optimize oral hygiene and moisture  - Encourage food from home; allow for food preferences  - Enhance eating environment  Outcome: Progressing  Goal: Achieves appropriate nutritional intake (bariatric)  Description: INTERVENTIONS:  - Monitor for over-consumption  - Identify factors contributing to increased intake, treat as appropriate  - Monitor I&O, WT and lab values  - Obtain nutritional consult as needed  - Evaluate psychosocial factors contributing to over-consumption  Outcome: Progressing

## 2022-07-29 NOTE — PLAN OF CARE
Problem: SAFETY ADULT - FALL  Goal: Free from fall injury  Description: INTERVENTIONS:  - Assess pt frequently for physical needs  - Identify cognitive and physical deficits and behaviors that affect risk of falls. - [place] fall precautions as indicated by assessment.  - Educate pt/family on patient safety including physical limitations  - Instruct pt to call for assistance with activity based on assessment  - Modify environment to reduce risk of injury  - Provide assistive devices as appropriate  - Consider OT/PT consult to assist with strengthening/mobility  - Encourage toileting schedule  Outcome: Progressing     Problem: POSTPARTUM  Goal: Long Term Goal:Experiences normal postpartum course  Description: INTERVENTIONS:  - Assess and monitor vital signs and lab values. - Assess fundus and lochia. - Provide ice/sitz baths for perineum discomfort. - Monitor healing of incision/episiotomy/laceration, and assess for signs and symptoms of infection and hematoma. - Assess bladder function and monitor for bladder distention.  - Provide/instruct/assist with pericare as needed. - Provide VTE prophylaxis as needed. - Monitor bowel function.  - Encourage ambulation and provide assistance as needed. - Assess and monitor emotional status and provide social service/psych resources as needed. - Utilize standard precautions and use personal protective equipment as indicated. Ensure aseptic care of all intravenous lines and invasive tubes/drains.  - Obtain immunization and exposure to communicable diseases history. Outcome: Progressing  Goal: Optimize infant feeding at the breast  Description: INTERVENTIONS:  - Initiate breast feeding within first hour after birth. - Monitor effectiveness of current breast feeding efforts. - Assess support systems available to mother/family.  - Identify cultural beliefs/practices regarding lactation, letdown techniques, maternal food preferences.   - Assess mother's knowledge and previous experience with breast feeding.  - Provide information as needed about early infant feeding cues (e.g., rooting, lip smacking, sucking fingers/hand) versus late cue of crying.  - Discuss/demonstrate breast feeding aids (e.g., infant sling, nursing footstool/pillows, and breast pumps). - Encourage mother/other family members to express feelings/concerns, and actively listen. - Educate father/SO about benefits of breast feeding and how to manage common lactation challenges. - Recommend avoidance of specific medications or substances incompatible with breast feeding.  - Assess and monitor for signs of nipple pain/trauma. - Instruct and provide assistance with proper latch. - Review techniques for milk expression (breast pumping) and storage of breast milk. Provide pumping equipment/supplies, instructions and assistance, as needed. - Encourage rooming-in and breast feeding on demand.  - Encourage skin-to-skin contact. - Provide LC support as needed. - Assess for and manage engorgement. - Provide breast feeding education handouts and information on community breast feeding support. Outcome: Progressing  Goal: Establishment of adequate milk supply with medication/procedure interruptions  Description: INTERVENTIONS:  - Review techniques for milk expression (breast pumping). - Provide pumping equipment/supplies, instructions, and assistance until it is safe to breastfeed infant. Outcome: Progressing  Goal: Appropriate maternal -  bonding  Description: INTERVENTIONS:  - Assess caregiver- interactions. - Assess caregiver's emotional status and coping mechanisms. - Encourage caregiver to participate in  daily care. - Assess support systems available to mother/family.  - Provide /case management support as needed.   Outcome: Progressing     Problem: GASTROINTESTINAL - ADULT  Goal: Minimal or absence of nausea and vomiting  Description: INTERVENTIONS:  - Maintain adequate hydration with IV or PO as ordered and tolerated  - Nasogastric tube to low intermittent suction as ordered  - Evaluate effectiveness of ordered antiemetic medications  - Provide nonpharmacologic comfort measures as appropriate  - Advance diet as tolerated, if ordered  - Obtain nutritional consult as needed  - Evaluate fluid balance  Outcome: Progressing  Goal: Maintains or returns to baseline bowel function  Description: INTERVENTIONS:  - Assess bowel function  - Maintain adequate hydration with IV or PO as ordered and tolerated  - Evaluate effectiveness of GI medications  - Encourage mobilization and activity  - Obtain nutritional consult as needed  - Establish a toileting routine/schedule  - Consider collaborating with pharmacy to review patient's medication profile  Outcome: Progressing  Goal: Maintains adequate nutritional intake (undernourished)  Description: INTERVENTIONS:  - Monitor percentage of each meal consumed  - Identify factors contributing to decreased intake, treat as appropriate  - Assist with meals as needed  - Monitor I&O, WT and lab values  - Obtain nutritional consult as needed  - Optimize oral hygiene and moisture  - Encourage food from home; allow for food preferences  - Enhance eating environment  Outcome: Progressing  Goal: Achieves appropriate nutritional intake (bariatric)  Description: INTERVENTIONS:  - Monitor for over-consumption  - Identify factors contributing to increased intake, treat as appropriate  - Monitor I&O, WT and lab values  - Obtain nutritional consult as needed  - Evaluate psychosocial factors contributing to over-consumption  Outcome: Progressing

## 2022-07-29 NOTE — CM/SW NOTE
met with Christina Sloan to review insurance and PCP for infant. Infant will be added to [facility] PPO that patient delivered under. PCP will be SAM Michael. Patient plans on breast feeding and has breast pump. Couple stated that they have car seat and crib ready for infant as well. All questions answered at this time.

## 2022-07-29 NOTE — PLAN OF CARE
Problem: POSTPARTUM  Goal: Long Term Goal:Experiences normal postpartum course  Description: INTERVENTIONS:  - Assess and monitor vital signs and lab values. - Assess fundus and lochia. - Provide ice/sitz baths for perineum discomfort. - Monitor healing of incision/episiotomy/laceration, and assess for signs and symptoms of infection and hematoma. - Assess bladder function and monitor for bladder distention.  - Provide/instruct/assist with pericare as needed. - Provide VTE prophylaxis as needed. - Monitor bowel function.  - Encourage ambulation and provide assistance as needed. - Assess and monitor emotional status and provide social service/psych resources as needed. - Utilize standard precautions and use personal protective equipment as indicated. Ensure aseptic care of all intravenous lines and invasive tubes/drains.  - Obtain immunization and exposure to communicable diseases history. Outcome: Progressing  Goal: Optimize infant feeding at the breast  Description: INTERVENTIONS:  - Initiate breast feeding within first hour after birth. - Monitor effectiveness of current breast feeding efforts. - Assess support systems available to mother/family.  - Identify cultural beliefs/practices regarding lactation, letdown techniques, maternal food preferences. - Assess mother's knowledge and previous experience with breast feeding.  - Provide information as needed about early infant feeding cues (e.g., rooting, lip smacking, sucking fingers/hand) versus late cue of crying.  - Discuss/demonstrate breast feeding aids (e.g., infant sling, nursing footstool/pillows, and breast pumps). - Encourage mother/other family members to express feelings/concerns, and actively listen. - Educate father/SO about benefits of breast feeding and how to manage common lactation challenges.   - Recommend avoidance of specific medications or substances incompatible with breast feeding.  - Assess and monitor for signs of nipple pain/trauma. - Instruct and provide assistance with proper latch. - Review techniques for milk expression (breast pumping) and storage of breast milk. Provide pumping equipment/supplies, instructions and assistance, as needed. - Encourage rooming-in and breast feeding on demand.  - Encourage skin-to-skin contact. - Provide LC support as needed. - Assess for and manage engorgement. - Provide breast feeding education handouts and information on community breast feeding support. Outcome: Progressing  Goal: Establishment of adequate milk supply with medication/procedure interruptions  Description: INTERVENTIONS:  - Review techniques for milk expression (breast pumping). - Provide pumping equipment/supplies, instructions, and assistance until it is safe to breastfeed infant. Outcome: Progressing  Goal: Appropriate maternal -  bonding  Description: INTERVENTIONS:  - Assess caregiver- interactions. - Assess caregiver's emotional status and coping mechanisms. - Encourage caregiver to participate in  daily care. - Assess support systems available to mother/family.  - Provide /case management support as needed. Outcome: Progressing     Problem: SAFETY ADULT - FALL  Goal: Free from fall injury  Description: INTERVENTIONS:  - Assess pt frequently for physical needs  - Identify cognitive and physical deficits and behaviors that affect risk of falls.   - [place] fall precautions as indicated by assessment.  - Educate pt/family on patient safety including physical limitations  - Instruct pt to call for assistance with activity based on assessment  - Modify environment to reduce risk of injury  - Provide assistive devices as appropriate  - Consider OT/PT consult to assist with strengthening/mobility  - Encourage toileting schedule  Outcome: Progressing

## 2022-07-30 NOTE — PLAN OF CARE
Problem: SAFETY ADULT - FALL  Goal: Free from fall injury  Description: INTERVENTIONS:  - Assess pt frequently for physical needs  - Identify cognitive and physical deficits and behaviors that affect risk of falls. - [place] fall precautions as indicated by assessment.  - Educate pt/family on patient safety including physical limitations  - Instruct pt to call for assistance with activity based on assessment  - Modify environment to reduce risk of injury  - Provide assistive devices as appropriate  - Consider OT/PT consult to assist with strengthening/mobility  - Encourage toileting schedule  Outcome: Progressing     Problem: POSTPARTUM  Goal: Long Term Goal:Experiences normal postpartum course  Description: INTERVENTIONS:  - Assess and monitor vital signs and lab values. - Assess fundus and lochia. - Provide ice/sitz baths for perineum discomfort. - Monitor healing of incision/episiotomy/laceration, and assess for signs and symptoms of infection and hematoma. - Assess bladder function and monitor for bladder distention.  - Provide/instruct/assist with pericare as needed. - Provide VTE prophylaxis as needed. - Monitor bowel function.  - Encourage ambulation and provide assistance as needed. - Assess and monitor emotional status and provide social service/psych resources as needed. - Utilize standard precautions and use personal protective equipment as indicated. Ensure aseptic care of all intravenous lines and invasive tubes/drains.  - Obtain immunization and exposure to communicable diseases history. Outcome: Progressing  Goal: Optimize infant feeding at the breast  Description: INTERVENTIONS:  - Initiate breast feeding within first hour after birth. - Monitor effectiveness of current breast feeding efforts. - Assess support systems available to mother/family.  - Identify cultural beliefs/practices regarding lactation, letdown techniques, maternal food preferences.   - Assess mother's knowledge and previous experience with breast feeding.  - Provide information as needed about early infant feeding cues (e.g., rooting, lip smacking, sucking fingers/hand) versus late cue of crying.  - Discuss/demonstrate breast feeding aids (e.g., infant sling, nursing footstool/pillows, and breast pumps). - Encourage mother/other family members to express feelings/concerns, and actively listen. - Educate father/SO about benefits of breast feeding and how to manage common lactation challenges. - Recommend avoidance of specific medications or substances incompatible with breast feeding.  - Assess and monitor for signs of nipple pain/trauma. - Instruct and provide assistance with proper latch. - Review techniques for milk expression (breast pumping) and storage of breast milk. Provide pumping equipment/supplies, instructions and assistance, as needed. - Encourage rooming-in and breast feeding on demand.  - Encourage skin-to-skin contact. - Provide LC support as needed. - Assess for and manage engorgement. - Provide breast feeding education handouts and information on community breast feeding support. Outcome: Progressing  Goal: Establishment of adequate milk supply with medication/procedure interruptions  Description: INTERVENTIONS:  - Review techniques for milk expression (breast pumping). - Provide pumping equipment/supplies, instructions, and assistance until it is safe to breastfeed infant. Outcome: Progressing  Goal: Appropriate maternal -  bonding  Description: INTERVENTIONS:  - Assess caregiver- interactions. - Assess caregiver's emotional status and coping mechanisms. - Encourage caregiver to participate in  daily care. - Assess support systems available to mother/family.  - Provide /case management support as needed.   Outcome: Progressing     Problem: GASTROINTESTINAL - ADULT  Goal: Maintains or returns to baseline bowel function  Description: INTERVENTIONS:  - Assess bowel function  - Maintain adequate hydration with IV or PO as ordered and tolerated  - Evaluate effectiveness of GI medications  - Encourage mobilization and activity  - Obtain nutritional consult as needed  - Establish a toileting routine/schedule  - Consider collaborating with pharmacy to review patient's medication profile  Outcome: Progressing  Goal: Maintains adequate nutritional intake (undernourished)  Description: INTERVENTIONS:  - Monitor percentage of each meal consumed  - Identify factors contributing to decreased intake, treat as appropriate  - Assist with meals as needed  - Monitor I&O, WT and lab values  - Obtain nutritional consult as needed  - Optimize oral hygiene and moisture  - Encourage food from home; allow for food preferences  - Enhance eating environment  Outcome: Progressing  Goal: Achieves appropriate nutritional intake (bariatric)  Description: INTERVENTIONS:  - Monitor for over-consumption  - Identify factors contributing to increased intake, treat as appropriate  - Monitor I&O, WT and lab values  - Obtain nutritional consult as needed  - Evaluate psychosocial factors contributing to over-consumption  Outcome: Progressing

## 2022-07-30 NOTE — PLAN OF CARE
Problem: POSTPARTUM  Goal: Long Term Goal:Experiences normal postpartum course  Description: INTERVENTIONS:  - Assess and monitor vital signs and lab values. - Assess fundus and lochia. - Provide ice/sitz baths for perineum discomfort. - Monitor healing of incision/episiotomy/laceration, and assess for signs and symptoms of infection and hematoma. - Assess bladder function and monitor for bladder distention.  - Provide/instruct/assist with pericare as needed. - Provide VTE prophylaxis as needed. - Monitor bowel function.  - Encourage ambulation and provide assistance as needed. - Assess and monitor emotional status and provide social service/psych resources as needed. - Utilize standard precautions and use personal protective equipment as indicated. Ensure aseptic care of all intravenous lines and invasive tubes/drains.  - Obtain immunization and exposure to communicable diseases history. Outcome: Progressing  Goal: Optimize infant feeding at the breast  Description: INTERVENTIONS:  - Initiate breast feeding within first hour after birth. - Monitor effectiveness of current breast feeding efforts. - Assess support systems available to mother/family.  - Identify cultural beliefs/practices regarding lactation, letdown techniques, maternal food preferences. - Assess mother's knowledge and previous experience with breast feeding.  - Provide information as needed about early infant feeding cues (e.g., rooting, lip smacking, sucking fingers/hand) versus late cue of crying.  - Discuss/demonstrate breast feeding aids (e.g., infant sling, nursing footstool/pillows, and breast pumps). - Encourage mother/other family members to express feelings/concerns, and actively listen. - Educate father/SO about benefits of breast feeding and how to manage common lactation challenges.   - Recommend avoidance of specific medications or substances incompatible with breast feeding.  - Assess and monitor for signs of nipple pain/trauma. - Instruct and provide assistance with proper latch. - Review techniques for milk expression (breast pumping) and storage of breast milk. Provide pumping equipment/supplies, instructions and assistance, as needed. - Encourage rooming-in and breast feeding on demand.  - Encourage skin-to-skin contact. - Provide LC support as needed. - Assess for and manage engorgement. - Provide breast feeding education handouts and information on community breast feeding support. Outcome: Progressing  Goal: Establishment of adequate milk supply with medication/procedure interruptions  Description: INTERVENTIONS:  - Review techniques for milk expression (breast pumping). - Provide pumping equipment/supplies, instructions, and assistance until it is safe to breastfeed infant. Outcome: Progressing  Goal: Appropriate maternal -  bonding  Description: INTERVENTIONS:  - Assess caregiver- interactions. - Assess caregiver's emotional status and coping mechanisms. - Encourage caregiver to participate in  daily care. - Assess support systems available to mother/family.  - Provide /case management support as needed.   Outcome: Progressing     Problem: GASTROINTESTINAL - ADULT  Goal: Maintains or returns to baseline bowel function  Description: INTERVENTIONS:  - Assess bowel function  - Maintain adequate hydration with IV or PO as ordered and tolerated  - Evaluate effectiveness of GI medications  - Encourage mobilization and activity  - Obtain nutritional consult as needed  - Establish a toileting routine/schedule  - Consider collaborating with pharmacy to review patient's medication profile  Outcome: Progressing

## 2022-07-31 NOTE — DISCHARGE SUMMARY
Admission date: 22  Discharge date: 22  Admission diagnosis:pre-term pregnancy in labor, previous  section  Discharge diagnosis: same  Operative Procedure: repeat low transverse  section  Hospital course: uncomplicated  Discharge medications: tylenol and ibuprofen 600mg  Follow up 2 weeks in office for incision check

## 2022-08-03 NOTE — PROGRESS NOTES
Reviewed self care w / mom, she verbalizes understanding of instructions reviewed. Encourage to follow up w/ MDs as directed and w/ questions/concerns. Infant remains in nicu now, mom pumping and visits daily. [facility] reviewed and has appt for bp check, enc to join ct.

## 2022-08-14 NOTE — TELEPHONE ENCOUNTER
Lactation consultant called mom to follow up with her regarding her infant who was recently discharged from NICU. Mom concerned about her low milk supply and ways to yield more milk. Lactation consultant reviewed ways to increase her milk supply. Mom is currently pumping 6 times a day, yields about 40 mls of milk in the morning and 10 mls throughout the day at each pump session. Mom reports that the right breast has little to no output. Information and phone number to lactation clinic given to mom and encouraged her to call and make appointment if further assistance was needed. Support and encouragement given.

## 2022-08-26 NOTE — TELEPHONE ENCOUNTER
Issues Identified: (actual or potential)  Inadequate supply and Pumping Issues    Infant 35 weeks, discharged from the NICU about 2 weeks ago. Pt stated infant was seen at 1 month check by Dr. Wing Perla and infant was doing well. Pt is calling because she is having difficulty with a low milk supply. She had been pumping 5- 6 times/ 24 hrs and obtaining 15-20 ml with pumping. The last 2 days she has increased her pumping frequency including night time pumping and she obtained 60 ml with pumping. Enc pt to provide skin to skin care prior to pumping,massage breasts and hand expression prior to pumping, increase pumping frequency, use the hands on pumping technique while pumping. Include 1-2 sessions of cluster pumping. Pt has a medela pump. Reviewed settings and adjusting pump strength to her comfort, proper flange fit and washing pump parts    Follow Up  Call back as needed    Mother's response  Mother verbalized understanding.

## 2023-04-27 NOTE — PROGRESS NOTES
Pt brought to NICU to visit with infant. Pt then transferred to room 2196. Report given to Chau Camp RN. Melolabial Transposition Flap Text: The defect edges were debeveled with a #15 scalpel blade.  Given the location of the defect and the proximity to free margins a melolabial flap was deemed most appropriate.  Using a sterile surgical marker, an appropriate melolabial transposition flap was drawn incorporating the defect.    The area thus outlined was incised deep to adipose tissue with a #15 scalpel blade.  The skin margins were undermined to an appropriate distance in all directions utilizing iris scissors.

## 2023-10-25 NOTE — PROGRESS NOTES
Pt is a l1 at 32 4/7 wk iup who is brought to room triage-3. Pt was seen in [facility] office and OB office today for reg appt. At [facility], BPP8/8. At her OB appt, a questionable deceleration was noted during her NST. Pt was sent to L&D for further monitoring. EFM tested and applied. POC discussed and questions answered.

## 2023-10-25 NOTE — DISCHARGE INSTRUCTIONS
Discharge Instructions    Diet: Diabetic  Activity: Normal activity         General Instructions    Call your [place] doctor if: Fluid leaking from your vagina;Uterine contractions 10 minutes or closer for 1 to 2 hours;Uterine contractions increasing in intensity and frequency; Decrease in fetal movement;Vaginal bleeding;Temperature greater than 100F;Vaginal or rectal pressure

## 2023-10-25 NOTE — PROGRESS NOTES
10/25/23 1826   Nonstress Test   Multiple NST? No   Variability 6-25 BPM   Decelerations None   Accelerations Yes   Acoustic Stimulator No   Baseline 125 BPM   Uterine Irritability No   Contractions Irregular   Interpretation   Nonstress Test Interpretation Reactive     Dr Chip Richards reviewed efm strip remotely.      Physician Evaluation      NST Interpretation: Reactive    Disposition:   Discharged    Comments:    Prolonged monitoring 1hr due to possible deceleration in the office; reactive and reassuring monitoring     Sigvidhi Posyincimas, DO

## 2023-10-31 NOTE — PROGRESS NOTES
Received the pt to the unit via ambulation with c/o ctx q 10 min. Pt is  a 27 yo [id number] with an hx of 2 previous c/s. First c/s was for fetal distress and ended in a  death. Second was a repeat at 35 weeks for labor. Pt states that she stating feeling ctx at appx 1800 and came to hopistial- but states that they have since slowed down. Pt into bed that is in the low locked position. Efm explained and thena pplied- abdomen is soft and non-tender. Pt denies leaking or bleeding and reports + fm. Pt reports gdm that is diet controlled at this time, no other complications. Pt informed will observe and then notify md.  Pt in agreement. Callbell within easy reach.

## 2023-10-31 NOTE — DISCHARGE INSTRUCTIONS
Discharge Instructions    Diet: as directed by md  Activity: Normal activity         General Instructions    Call your [facility] doctor if: Fluid leaking from your vagina;Uterine contractions 10 minutes or closer for 1 to 2 hours;Uterine contractions increasing in intensity and frequency; Decrease in fetal movement;Vaginal bleeding;Temperature greater than 100F;Vaginal or rectal pressure                Labor Discharge Instructions    Call your OB doctor if you have any of the following signs:    Period-like cramps that may come and go  Low, dull backache  Pressure in your lower abdomen that may feel like the baby is pushing down  Change in the type or amount of vaginal discharge  Abdominal cramps that may be accompanied by diarrhea  Fluid leaking from your vagina (may or may not be bloody)  Uterine Activity Instructions:

## 2023-10-31 NOTE — NST
Nonstress Test   Patient: Claudine Villarreal    Gestation: 33w2d    NST:       Variability: Moderate           Accelerations: Yes           Decelerations: None            Baseline: 130 BPM           Uterine Irritability: Yes           Contractions: Irregular                    Contraction Frequency: 3-5 for the last 10 min- no increase in ot awareness or pain with them                   Acoustic Stimulator: No           Nonstress Test Interpretation: Reactive           Nonstress Test Second Interpretation: Reactive                     Additional Comments

## 2023-10-31 NOTE — PROGRESS NOTES
Pt given betamethasone per dr order. Discharge instructions provided. Ptin agreement. Pt to home- pt will return tomorrow evening for second shot.

## 2023-10-31 NOTE — PROGRESS NOTES
Physician Evaluation      NST Interpretation: Reactive    Disposition:   Discharged    Comments:    Prior h/o PTD x 2 (living 1). Pt requested and given betamethasone.        Sabrina Almanzar MD

## 2023-11-01 NOTE — PROGRESS NOTES
Pt admitted to triage 4 for second Betamethasone injection. See details in [facility]. Pt tolerated well. Discharge instructions given verbally. Dr Diego Holter aware.

## 2023-11-06 NOTE — PROGRESS NOTES
Discharge instructions given and explained, pt verbalizes understanding and agrees. Aware she is to return to L&D with worsening labor sx, otherwise f/u at next scheduled appointment. Pt ambulatory and discharged home.

## 2023-11-06 NOTE — PROGRESS NOTES
Pt is a 28year old female admitted to TR5/TR5-A. Patient presents with:  R/o  Labor     Pt is [id number] 34w1d intra-uterine pregnancy. History obtained, consents signed. Oriented to room, staff, and plan of care. Pt c/o ctx since 1700 that became more frequent at . Denies LOF, vaginal bleeding. EFM tested and applied, FHTs tracing and audible in 150s. Abdomen soft and non-tender.

## 2023-11-06 NOTE — DISCHARGE INSTRUCTIONS
Discharge Instructions    Diet: Diabetic  Activity: Normal activity         General Instructions    Call your [facility] doctor if: Fluid leaking from your vagina;Uterine contractions 10 minutes or closer for 1 to 2 hours;Uterine contractions increasing in intensity and frequency; Decrease in fetal movement;Vaginal bleeding;Temperature greater than 100F;Vaginal or rectal pressure     Return to labor and delivery with contractions that increase in frequency and/or intensity, otherwise follow up at next scheduled appointment

## 2023-11-06 NOTE — NST
Nonstress Test   Patient: Helon Dee Dee    Gestation: 34w2d    NST:       Variability: Moderate           Accelerations: Yes           Decelerations: None            Baseline: 135 BPM           Uterine Irritability: Yes           Contractions: Irregular                                        Acoustic Stimulator: No           Nonstress Test Interpretation: Reactive           Nonstress Test Second Interpretation: Reactive                     Additional Comments      Physician Evaluation      NST Interpretation: Reactive    Disposition:   Discharged    Comments:    none    Karuna Rich MD

## 2023-11-22 NOTE — PLAN OF CARE
Problem: BIRTH - VAGINAL/ SECTION  Goal: Fetal and maternal status remain reassuring during the birth process  Description: INTERVENTIONS:  - Monitor vital signs  - Monitor fetal heart rate  - Monitor uterine activity  - Monitor labor progression (vaginal delivery)  - DVT prophylaxis (C/S delivery)  - Surgical antibiotic prophylaxis (C/S delivery)  Outcome: Progressing     Problem: PAIN - ADULT  Goal: Verbalizes/displays adequate comfort level or patient's stated pain goal  Description: INTERVENTIONS:  - Encourage pt to monitor pain and request assistance  - Assess pain using appropriate pain scale  - Administer analgesics based on type and severity of pain and evaluate response  - Implement non-pharmacological measures as appropriate and evaluate response  - Consider cultural and social influences on pain and pain management  - Manage/alleviate anxiety  - Utilize distraction and/or relaxation techniques  - Monitor for opioid side effects  - Notify MD/LIP if interventions unsuccessful or patient reports new pain  - Anticipate increased pain with activity and pre-medicate as appropriate  Outcome: Progressing     Problem: ANXIETY  Goal: Will report anxiety at manageable levels  Description: INTERVENTIONS:  - Administer medication as ordered  - Teach and rehearse alternative coping skills  - Provide emotional support with 1:1 interaction with staff  Outcome: Progressing     Problem: Patient/Family Goals  Goal: Patient/Family Long Term Goal  Description: Patient's Long Term Goal: Adequate pain management    Interventions:  -   - See additional Care Plan goals for specific interventions  Outcome: Progressing  Goal: Patient/Family Short Term Goal  Description: Patient's Short Term Goal: Safe  delivery    Interventions:   -   - See additional Care Plan goals for specific interventions  Outcome: Progressing     Problem: POSTPARTUM  Goal: Long Term Goal:Experiences normal postpartum course  Description: INTERVENTIONS:  - Assess and monitor vital signs and lab values. - Assess fundus and lochia. - Provide ice/sitz baths for perineum discomfort. - Monitor healing of incision/episiotomy/laceration, and assess for signs and symptoms of infection and hematoma. - Assess bladder function and monitor for bladder distention.  - Provide/instruct/assist with pericare as needed. - Provide VTE prophylaxis as needed. - Monitor bowel function.  - Encourage ambulation and provide assistance as needed. - Assess and monitor emotional status and provide social service/psych resources as needed. - Utilize standard precautions and use personal protective equipment as indicated. Ensure aseptic care of all intravenous lines and invasive tubes/drains.  - Obtain immunization and exposure to communicable diseases history. Outcome: Progressing  Goal: Optimize infant feeding at the breast  Description: INTERVENTIONS:  - Initiate breast feeding within first hour after birth. - Monitor effectiveness of current breast feeding efforts. - Assess support systems available to mother/family.  - Identify cultural beliefs/practices regarding lactation, letdown techniques, maternal food preferences. - Assess mother's knowledge and previous experience with breast feeding.  - Provide information as needed about early infant feeding cues (e.g., rooting, lip smacking, sucking fingers/hand) versus late cue of crying.  - Discuss/demonstrate breast feeding aids (e.g., infant sling, nursing footstool/pillows, and breast pumps). - Encourage mother/other family members to express feelings/concerns, and actively listen. - Educate father/SO about benefits of breast feeding and how to manage common lactation challenges. - Recommend avoidance of specific medications or substances incompatible with breast feeding.  - Assess and monitor for signs of nipple pain/trauma. - Instruct and provide assistance with proper latch.   - Review techniques for milk expression (breast pumping) and storage of breast milk. Provide pumping equipment/supplies, instructions and assistance, as needed. - Encourage rooming-in and breast feeding on demand.  - Encourage skin-to-skin contact. - Provide LC support as needed. - Assess for and manage engorgement. - Provide breast feeding education handouts and information on community breast feeding support. Outcome: Progressing  Goal: Establishment of adequate milk supply with medication/procedure interruptions  Description: INTERVENTIONS:  - Review techniques for milk expression (breast pumping). - Provide pumping equipment/supplies, instructions, and assistance until it is safe to breastfeed infant. Outcome: Progressing  Goal: Experiences normal breast weaning course  Description: INTERVENTIONS:  - Assess for and manage engorgement. - Instruct on breast care. - Provide comfort measures. Outcome: Progressing  Goal: Appropriate maternal -  bonding  Description: INTERVENTIONS:  - Assess caregiver- interactions. - Assess caregiver's emotional status and coping mechanisms. - Encourage caregiver to participate in  daily care. - Assess support systems available to mother/family.  - Provide /case management support as needed.   Outcome: Progressing

## 2023-11-22 NOTE — PLAN OF CARE
Problem: POSTPARTUM  Goal: Long Term Goal:Experiences normal postpartum course  Description: INTERVENTIONS:  - Assess and monitor vital signs and lab values. - Assess fundus and lochia. - Provide ice/sitz baths for perineum discomfort. - Monitor healing of incision/episiotomy/laceration, and assess for signs and symptoms of infection and hematoma. - Assess bladder function and monitor for bladder distention.  - Provide/instruct/assist with pericare as needed. - Provide VTE prophylaxis as needed. - Monitor bowel function.  - Encourage ambulation and provide assistance as needed. - Assess and monitor emotional status and provide social service/psych resources as needed. - Utilize standard precautions and use personal protective equipment as indicated. Ensure aseptic care of all intravenous lines and invasive tubes/drains.  - Obtain immunization and exposure to communicable diseases history. Outcome: Progressing  Goal: Optimize infant feeding at the breast  Description: INTERVENTIONS:  - Initiate breast feeding within first hour after birth. - Monitor effectiveness of current breast feeding efforts. - Assess support systems available to mother/family.  - Identify cultural beliefs/practices regarding lactation, letdown techniques, maternal food preferences. - Assess mother's knowledge and previous experience with breast feeding.  - Provide information as needed about early infant feeding cues (e.g., rooting, lip smacking, sucking fingers/hand) versus late cue of crying.  - Discuss/demonstrate breast feeding aids (e.g., infant sling, nursing footstool/pillows, and breast pumps). - Encourage mother/other family members to express feelings/concerns, and actively listen. - Educate father/SO about benefits of breast feeding and how to manage common lactation challenges.   - Recommend avoidance of specific medications or substances incompatible with breast feeding.  - Assess and monitor for signs of nipple pain/trauma. - Instruct and provide assistance with proper latch. - Review techniques for milk expression (breast pumping) and storage of breast milk. Provide pumping equipment/supplies, instructions and assistance, as needed. - Encourage rooming-in and breast feeding on demand.  - Encourage skin-to-skin contact. - Provide LC support as needed. - Assess for and manage engorgement. - Provide breast feeding education handouts and information on community breast feeding support. Outcome: Progressing  Goal: Establishment of adequate milk supply with medication/procedure interruptions  Description: INTERVENTIONS:  - Review techniques for milk expression (breast pumping). - Provide pumping equipment/supplies, instructions, and assistance until it is safe to breastfeed infant. Outcome: Progressing  Goal: Experiences normal breast weaning course  Description: INTERVENTIONS:  - Assess for and manage engorgement. - Instruct on breast care. - Provide comfort measures. Outcome: Progressing  Goal: Appropriate maternal -  bonding  Description: INTERVENTIONS:  - Assess caregiver- interactions. - Assess caregiver's emotional status and coping mechanisms. - Encourage caregiver to participate in  daily care. - Assess support systems available to mother/family.  - Provide /case management support as needed.   Outcome: Progressing     Problem: GASTROINTESTINAL - ADULT  Goal: Minimal or absence of nausea and vomiting  Description: INTERVENTIONS:  - Maintain adequate hydration with IV or PO as ordered and tolerated  - Nasogastric tube to low intermittent suction as ordered  - Evaluate effectiveness of ordered antiemetic medications  - Provide nonpharmacologic comfort measures as appropriate  - Advance diet as tolerated, if ordered  - Obtain nutritional consult as needed  - Evaluate fluid balance  Outcome: Progressing  Goal: Maintains or returns to baseline bowel function  Description: INTERVENTIONS:  - Assess bowel function  - Maintain adequate hydration with IV or PO as ordered and tolerated  - Evaluate effectiveness of GI medications  - Encourage mobilization and activity  - Obtain nutritional consult as needed  - Establish a toileting routine/schedule  - Consider collaborating with pharmacy to review patient's medication profile  Outcome: Progressing  Goal: Maintains adequate nutritional intake (undernourished)  Description: INTERVENTIONS:  - Monitor percentage of each meal consumed  - Identify factors contributing to decreased intake, treat as appropriate  - Assist with meals as needed  - Monitor I&O, WT and lab values  - Obtain nutritional consult as needed  - Optimize oral hygiene and moisture  - Encourage food from home; allow for food preferences  - Enhance eating environment  Outcome: Progressing  Goal: Achieves appropriate nutritional intake (bariatric)  Description: INTERVENTIONS:  - Monitor for over-consumption  - Identify factors contributing to increased intake, treat as appropriate  - Monitor I&O, WT and lab values  - Obtain nutritional consult as needed  - Evaluate psychosocial factors contributing to over-consumption  Outcome: Progressing

## 2023-11-22 NOTE — PROGRESS NOTES
admitted to triage 3 via wheelchair with  present. Pt to bathroom, gown on, pt to bed. Pt states has had back pain and cramping since this morning and ctxs began around 2100. Pt denies bleeding or lof. Efm and toco applied. Initial assessment done.

## 2023-11-22 NOTE — H&P
[address] and Delivery Prenatal History and Physical Interval Addendum  Please see full Prenatal Record for this pregnancy      SUBJECTIVE:    Interval History: This is a pregnancy at 36 weeks admitted for labor. Contractions became regular and strong overnight. No LOF  Pt has hx of 2 prior   deliveries, w/labor at 28 and 35 w  Received betamethasone 10/30 and .     Pregnancy notable for:   - GDM A1  - 2 prior cesareans, with one  demise  - hx PIH    OBJECTIVE:    The patient is uncomfortable with contractions    /94    Cx advanced from 1.5 to 2 cm over 1 hr w/worsening contraction strength    Fetal Surveillance:  see NST in holding    Recent Labs   Lab 23  0140   RBC 4.36   HGB 13.9   HCT 39.3   MCV 90.1   MCH 31.9   MCHC 35.4   RDW 13.9   NEPRELIM 4.61   WBC 7.6   .0*       CMP, PCR pending      ASSESSMENT/PLAN:    36 wk pregnancy, prior  x 2, in labor   For RCS   Discussed prematurity  Gbs status: unk    Problems pertinent to admission:  As above  PIH panel and serial BP being perfromed

## 2023-11-22 NOTE — PROGRESS NOTES
Report given to [facility]. Efm and toco removed. Pt ambulated to OR 2. Pt alert and stable.

## 2023-11-22 NOTE — ANESTHESIA PROCEDURE NOTES
Spinal Block    Date/Time: 11/22/2023 2:10 AM    Performed by: Zhane Leung MD  Authorized by: Zhane Leung MD      General Information and Staff    Start Time:  11/22/2023 2:10 AM  End Time:  11/22/2023 2:15 AM  Anesthesiologist:  Zhane Leung MD  Performed by:   Anesthesiologist  Patient Location:  OB  Site identification: surface landmarks    Preanesthetic Checklist: patient identified, IV checked, risks and benefits discussed, monitors and equipment checked, pre-op evaluation, timeout performed, anesthesia consent and sterile technique used      Procedure Details    Patient Position:  Sitting  Prep: ChloraPrep    Monitoring:  Cardiac monitor, heart rate and continuous pulse ox  Approach:  Midline  Location:  L3-4  Injection Technique:  Single-shot    Needle    Needle Type:  Sprotte  Needle Gauge:  24 G  Needle Length:  3.5 in    Assessment    Sensory Level:  T8  Events: clear CSF, CSF aspirated, well tolerated and blood negative      Additional Comments

## 2023-11-22 NOTE — PLAN OF CARE
Problem: BIRTH - VAGINAL/ SECTION  Goal: Fetal and maternal status remain reassuring during the birth process  Description: INTERVENTIONS:  - Monitor vital signs  - Monitor fetal heart rate  - Monitor uterine activity  - Monitor labor progression (vaginal delivery)  - DVT prophylaxis (C/S delivery)  - Surgical antibiotic prophylaxis (C/S delivery)  Outcome: Progressing     Problem: PAIN - ADULT  Goal: Verbalizes/displays adequate comfort level or patient's stated pain goal  Description: INTERVENTIONS:  - Encourage pt to monitor pain and request assistance  - Assess pain using appropriate pain scale  - Administer analgesics based on type and severity of pain and evaluate response  - Implement non-pharmacological measures as appropriate and evaluate response  - Consider cultural and social influences on pain and pain management  - Manage/alleviate anxiety  - Utilize distraction and/or relaxation techniques  - Monitor for opioid side effects  - Notify MD/LIP if interventions unsuccessful or patient reports new pain  - Anticipate increased pain with activity and pre-medicate as appropriate  Outcome: Progressing     Problem: ANXIETY  Goal: Will report anxiety at manageable levels  Description: INTERVENTIONS:  - Administer medication as ordered  - Teach and rehearse alternative coping skills  - Provide emotional support with 1:1 interaction with staff  Outcome: Progressing     Problem: Patient/Family Goals  Goal: Patient/Family Long Term Goal  Description: Patient's Long Term Goal:     Interventions:  -   - See additional Care Plan goals for specific interventions  Outcome: Progressing  Goal: Patient/Family Short Term Goal  Description: Patient's Short Term Goal:     Interventions:   -   - See additional Care Plan goals for specific interventions  Outcome: Progressing     Problem: POSTPARTUM  Goal: Long Term Goal:Experiences normal postpartum course  Description: INTERVENTIONS:  - Assess and monitor vital signs and lab values. - Assess fundus and lochia. - Provide ice/sitz baths for perineum discomfort. - Monitor healing of incision/episiotomy/laceration, and assess for signs and symptoms of infection and hematoma. - Assess bladder function and monitor for bladder distention.  - Provide/instruct/assist with pericare as needed. - Provide VTE prophylaxis as needed. - Monitor bowel function.  - Encourage ambulation and provide assistance as needed. - Assess and monitor emotional status and provide social service/psych resources as needed. - Utilize standard precautions and use personal protective equipment as indicated. Ensure aseptic care of all intravenous lines and invasive tubes/drains.  - Obtain immunization and exposure to communicable diseases history. Outcome: Progressing  Goal: Optimize infant feeding at the breast  Description: INTERVENTIONS:  - Initiate breast feeding within first hour after birth. - Monitor effectiveness of current breast feeding efforts. - Assess support systems available to mother/family.  - Identify cultural beliefs/practices regarding lactation, letdown techniques, maternal food preferences. - Assess mother's knowledge and previous experience with breast feeding.  - Provide information as needed about early infant feeding cues (e.g., rooting, lip smacking, sucking fingers/hand) versus late cue of crying.  - Discuss/demonstrate breast feeding aids (e.g., infant sling, nursing footstool/pillows, and breast pumps). - Encourage mother/other family members to express feelings/concerns, and actively listen. - Educate father/SO about benefits of breast feeding and how to manage common lactation challenges. - Recommend avoidance of specific medications or substances incompatible with breast feeding.  - Assess and monitor for signs of nipple pain/trauma. - Instruct and provide assistance with proper latch.   - Review techniques for milk expression (breast pumping) and storage of breast milk. Provide pumping equipment/supplies, instructions and assistance, as needed. - Encourage rooming-in and breast feeding on demand.  - Encourage skin-to-skin contact. - Provide LC support as needed. - Assess for and manage engorgement. - Provide breast feeding education handouts and information on community breast feeding support. Outcome: Progressing  Goal: Establishment of adequate milk supply with medication/procedure interruptions  Description: INTERVENTIONS:  - Review techniques for milk expression (breast pumping). - Provide pumping equipment/supplies, instructions, and assistance until it is safe to breastfeed infant. Outcome: Progressing  Goal: Experiences normal breast weaning course  Description: INTERVENTIONS:  - Assess for and manage engorgement. - Instruct on breast care. - Provide comfort measures. Outcome: Progressing  Goal: Appropriate maternal -  bonding  Description: INTERVENTIONS:  - Assess caregiver- interactions. - Assess caregiver's emotional status and coping mechanisms. - Encourage caregiver to participate in  daily care. - Assess support systems available to mother/family.  - Provide /case management support as needed.   Outcome: Progressing

## 2023-11-22 NOTE — OPERATIVE REPORT
PREOPERATIVE DIAGNOSIS:   1. 36 week pregnancy  2.  labor  3. Previous cesareans    POSTOPERATIVE DIAGNOSIS:   same    PROCEDURE PERFORMED: repeat low transverse  section. SURGEON: Cade Bryson MD    ASSISTANT: Vanesa Minor MD    ANESTHESIA: Spinal    FINDINGS: A male infant was delivered from cephalic presentation. Amniotic fluid was clear. Uterus and bilateral adnexa were normal.    PROCEDURE:   The involvement of the assisting physician was requested and provided aid in exposure/retraction, hemostasis, closure, and other intraoperative technical functions to help me as the primary surgeon carry out a safe operation with optimized results/outcome. After informed consent, the patient was taken to the operating room, where anesthesia was dosed. The patient was placed supine in a left tilt. Blank catheter was in place in the bladder and the abdomen was prepped and draped. Incision:   Pf[place]steil Y    Pelosi N  Incision was created using a combination of sharp, cautery, and blunt dissection. Once abdomen was safely entered, a bladder blade was placed. Transverse lower segment incision was made with a scalpel and extended digitally. Amniotomy: already occurred: N   artificially performed with Metzenbaums: Y  The fetal head was brought through the uterine incision with manual guidance and extrinsic abdominal pressure. Vacuum used: N  (disengagements:  n/a)  The fetal body delivered without difficulty. The infant was bulb suctioned. Cord was doubly clamped after the appropriate delay then cut, and the infant was taken to the neonatologist.   Placenta was delivered: manually N    expressed Y  An Nima retractor was placed, and the uterus was internally inspected and cleared of debris. Uterine incision was closed with a continuous locking 0-Vicryl suture. Second layer of imbrication: N  Hemostasis aided by: Isolated hemostatic sutures Y  electrosurgery Y  Gutters were cleared.  We rechecked the uterine and bladder flap hemostasis and with minimal cautery found it to be good. Camejo Cellar was removed. Subfascial area was inspected and found to be dry. 0 Vicryl was used to close the fascia in a continuous running fashion. Subcutaneous tissue was irrigated, dried, and found to be hemostatic. 2-0 plain gut used to reapproximate the subcutaneous fat: N  Skin was closed with 4-0 Vicryl in a subcuticular fashion. Mastisol and Steri-Strips were applied. All sponge, needle, and instrument counts were correct. Estimated blood loss was 750 ml. Infant and mother went to the recovery room in good condition.

## 2023-11-24 NOTE — PLAN OF CARE
Problem: POSTPARTUM  Goal: Long Term Goal:Experiences normal postpartum course  Description: INTERVENTIONS:  - Assess and monitor vital signs and lab values. - Assess fundus and lochia. - Provide ice/sitz baths for perineum discomfort. - Monitor healing of incision/episiotomy/laceration, and assess for signs and symptoms of infection and hematoma. - Assess bladder function and monitor for bladder distention.  - Provide/instruct/assist with pericare as needed. - Provide VTE prophylaxis as needed. - Monitor bowel function.  - Encourage ambulation and provide assistance as needed. - Assess and monitor emotional status and provide social service/psych resources as needed. - Utilize standard precautions and use personal protective equipment as indicated. Ensure aseptic care of all intravenous lines and invasive tubes/drains.  - Obtain immunization and exposure to communicable diseases history. Outcome: Progressing  Goal: Optimize infant feeding at the breast  Description: INTERVENTIONS:  - Initiate breast feeding within first hour after birth. - Monitor effectiveness of current breast feeding efforts. - Assess support systems available to mother/family.  - Identify cultural beliefs/practices regarding lactation, letdown techniques, maternal food preferences. - Assess mother's knowledge and previous experience with breast feeding.  - Provide information as needed about early infant feeding cues (e.g., rooting, lip smacking, sucking fingers/hand) versus late cue of crying.  - Discuss/demonstrate breast feeding aids (e.g., infant sling, nursing footstool/pillows, and breast pumps). - Encourage mother/other family members to express feelings/concerns, and actively listen. - Educate father/SO about benefits of breast feeding and how to manage common lactation challenges.   - Recommend avoidance of specific medications or substances incompatible with breast feeding.  - Assess and monitor for signs of nipple pain/trauma. - Instruct and provide assistance with proper latch. - Review techniques for milk expression (breast pumping) and storage of breast milk. Provide pumping equipment/supplies, instructions and assistance, as needed. - Encourage rooming-in and breast feeding on demand.  - Encourage skin-to-skin contact. - Provide LC support as needed. - Assess for and manage engorgement. - Provide breast feeding education handouts and information on community breast feeding support. Outcome: Progressing  Goal: Establishment of adequate milk supply with medication/procedure interruptions  Description: INTERVENTIONS:  - Review techniques for milk expression (breast pumping). - Provide pumping equipment/supplies, instructions, and assistance until it is safe to breastfeed infant. Outcome: Progressing  Goal: Appropriate maternal -  bonding  Description: INTERVENTIONS:  - Assess caregiver- interactions. - Assess caregiver's emotional status and coping mechanisms. - Encourage caregiver to participate in  daily care. - Assess support systems available to mother/family.  - Provide /case management support as needed.   Outcome: Progressing     Problem: GASTROINTESTINAL - ADULT  Goal: Achieves appropriate nutritional intake (bariatric)  Description: INTERVENTIONS:  - Monitor for over-consumption  - Identify factors contributing to increased intake, treat as appropriate  - Monitor I&O, WT and lab values  - Obtain nutritional consult as needed  - Evaluate psychosocial factors contributing to over-consumption  Outcome: Progressing

## 2023-11-26 NOTE — PROGRESS NOTES
Pt orientated to room, call light within reach. Pt instructed not to get out of bed. Plan of care discussed, pt upset with having to be admitted but verbalizes understanding.

## 2023-11-26 NOTE — PROGRESS NOTES
Pt is a 28year old female admitted to [facility]. Chief Complaint   Patient presents with    Elevated BP      Pt is [id number] 36w4d intra-uterine pregnancy. History obtained, consents signed. Oriented to room, staff, and plan of care. Pt c/o chest tightness and elevated BP. Repeat c/s on 11/22, hx of preeclampsia with last delivery, readmitted for mag. States BP was 169/98 at home. Also c/o HA that started on the way here, denies visual disturbances, epigastric pain.

## 2023-11-26 NOTE — PLAN OF CARE
Problem: Patient/Family Goals  Goal: Patient/Family Long Term Goal  Description: Patient's Long Term Goal: remain seizure free    Interventions:  - labetalol  Magnesium sulfate  Low stimulus  - See additional Care Plan goals for specific interventions  Outcome: Progressing  Goal: Patient/Family Short Term Goal  Description: Patient's Short Term Goal: maintain blood pressures in normal range    Interventions:   - labetalol  - See additional Care Plan goals for specific interventions  Outcome: Progressing

## 2023-11-27 NOTE — DISCHARGE SUMMARY
Admission date: 11/25/2023  Discharge date: 11/27/2023  Admission diagnosis: postpartum severe pre-eclampsia  Discharge diagnosis: same  Operative Procedure: none  Hospital course: Patient admitted for observation, received IV labetalol for severe range BP, started on magnesium sulfate for seizure prophylaxis for 24hr. BP stable on labetalol 200mg PO bid and patient discharged in stable condition.   Discharge medications: labetalol 200mg PO bid  F/u for BP check in 3-5 days

## 2023-11-27 NOTE — PROGRESS NOTES
Discharge instructions explained to pt, rec'd copy of instructions and verb understanding. Pt discharged to home, via w/c out of unit holding infant in car seat, with PCT and RN. Pt in stable condition.

## 2023-11-30 NOTE — DISCHARGE SUMMARY
[facility] Discharge Summary    Date of admission:  2023 11:20 PM  Date of discharge:  2023  Admit diagnosis:  36w4d       labor     A1GDM     History of PIH     Previous  deliveries  Discharge diagnosis: Same     Status post  section  Hospital course:     Brian Barajas is a 28year old [id number] at 37w2d, who presents in  labor. She is now status post uncomplicated repeat  section. Hospital course/postpartum recovery has been unremarkable.   Disposition:  Home  Condition at discharge: Stable, ambulatory  Activity:   Restricted, pelvic rest  Diet:   General  Discharge meds: Motrin prn  Tylenol prn  Gabapentin prn  Oxycodone 5 mg po q6h prn  Follow-up with MD: 2 and 6 wks